# Patient Record
Sex: FEMALE | Race: OTHER | URBAN - METROPOLITAN AREA
[De-identification: names, ages, dates, MRNs, and addresses within clinical notes are randomized per-mention and may not be internally consistent; named-entity substitution may affect disease eponyms.]

---

## 2020-10-15 ENCOUNTER — EMERGENCY (EMERGENCY)
Facility: HOSPITAL | Age: 25
LOS: 1 days | Discharge: HOME | End: 2020-10-15
Attending: EMERGENCY MEDICINE | Admitting: EMERGENCY MEDICINE
Payer: COMMERCIAL

## 2020-10-15 VITALS
DIASTOLIC BLOOD PRESSURE: 80 MMHG | HEART RATE: 97 BPM | WEIGHT: 126.1 LBS | RESPIRATION RATE: 18 BRPM | OXYGEN SATURATION: 96 % | SYSTOLIC BLOOD PRESSURE: 118 MMHG | HEIGHT: 63 IN | TEMPERATURE: 99 F

## 2020-10-15 VITALS
RESPIRATION RATE: 18 BRPM | SYSTOLIC BLOOD PRESSURE: 129 MMHG | DIASTOLIC BLOOD PRESSURE: 73 MMHG | OXYGEN SATURATION: 96 % | HEART RATE: 90 BPM

## 2020-10-15 DIAGNOSIS — N76.89 OTHER SPECIFIED INFLAMMATION OF VAGINA AND VULVA: ICD-10-CM

## 2020-10-15 DIAGNOSIS — N76.4 ABSCESS OF VULVA: ICD-10-CM

## 2020-10-15 LAB
ALBUMIN SERPL ELPH-MCNC: 3.9 G/DL — SIGNIFICANT CHANGE UP (ref 3.3–5)
ALP SERPL-CCNC: 44 U/L — SIGNIFICANT CHANGE UP (ref 40–120)
ALT FLD-CCNC: 6 U/L — LOW (ref 10–45)
ANION GAP SERPL CALC-SCNC: 12 MMOL/L — SIGNIFICANT CHANGE UP (ref 5–17)
AST SERPL-CCNC: 11 U/L — SIGNIFICANT CHANGE UP (ref 10–40)
BASOPHILS # BLD AUTO: 0.02 K/UL — SIGNIFICANT CHANGE UP (ref 0–0.2)
BASOPHILS NFR BLD AUTO: 0.2 % — SIGNIFICANT CHANGE UP (ref 0–2)
BILIRUB SERPL-MCNC: 0.5 MG/DL — SIGNIFICANT CHANGE UP (ref 0.2–1.2)
BUN SERPL-MCNC: 7 MG/DL — SIGNIFICANT CHANGE UP (ref 7–23)
CALCIUM SERPL-MCNC: 9.3 MG/DL — SIGNIFICANT CHANGE UP (ref 8.4–10.5)
CHLORIDE SERPL-SCNC: 105 MMOL/L — SIGNIFICANT CHANGE UP (ref 96–108)
CO2 SERPL-SCNC: 24 MMOL/L — SIGNIFICANT CHANGE UP (ref 22–31)
CREAT SERPL-MCNC: 0.62 MG/DL — SIGNIFICANT CHANGE UP (ref 0.5–1.3)
EOSINOPHIL # BLD AUTO: 0.06 K/UL — SIGNIFICANT CHANGE UP (ref 0–0.5)
EOSINOPHIL NFR BLD AUTO: 0.6 % — SIGNIFICANT CHANGE UP (ref 0–6)
GLUCOSE SERPL-MCNC: 93 MG/DL — SIGNIFICANT CHANGE UP (ref 70–99)
HCT VFR BLD CALC: 37.3 % — SIGNIFICANT CHANGE UP (ref 34.5–45)
HGB BLD-MCNC: 12.2 G/DL — SIGNIFICANT CHANGE UP (ref 11.5–15.5)
IMM GRANULOCYTES NFR BLD AUTO: 0.2 % — SIGNIFICANT CHANGE UP (ref 0–1.5)
LYMPHOCYTES # BLD AUTO: 1.73 K/UL — SIGNIFICANT CHANGE UP (ref 1–3.3)
LYMPHOCYTES # BLD AUTO: 18.4 % — SIGNIFICANT CHANGE UP (ref 13–44)
MCHC RBC-ENTMCNC: 31 PG — SIGNIFICANT CHANGE UP (ref 27–34)
MCHC RBC-ENTMCNC: 32.7 GM/DL — SIGNIFICANT CHANGE UP (ref 32–36)
MCV RBC AUTO: 94.7 FL — SIGNIFICANT CHANGE UP (ref 80–100)
MONOCYTES # BLD AUTO: 0.68 K/UL — SIGNIFICANT CHANGE UP (ref 0–0.9)
MONOCYTES NFR BLD AUTO: 7.2 % — SIGNIFICANT CHANGE UP (ref 2–14)
NEUTROPHILS # BLD AUTO: 6.89 K/UL — SIGNIFICANT CHANGE UP (ref 1.8–7.4)
NEUTROPHILS NFR BLD AUTO: 73.4 % — SIGNIFICANT CHANGE UP (ref 43–77)
NRBC # BLD: 0 /100 WBCS — SIGNIFICANT CHANGE UP (ref 0–0)
PLATELET # BLD AUTO: 258 K/UL — SIGNIFICANT CHANGE UP (ref 150–400)
POTASSIUM SERPL-MCNC: 4.1 MMOL/L — SIGNIFICANT CHANGE UP (ref 3.5–5.3)
POTASSIUM SERPL-SCNC: 4.1 MMOL/L — SIGNIFICANT CHANGE UP (ref 3.5–5.3)
PROT SERPL-MCNC: 7.3 G/DL — SIGNIFICANT CHANGE UP (ref 6–8.3)
RBC # BLD: 3.94 M/UL — SIGNIFICANT CHANGE UP (ref 3.8–5.2)
RBC # FLD: 12.6 % — SIGNIFICANT CHANGE UP (ref 10.3–14.5)
SODIUM SERPL-SCNC: 141 MMOL/L — SIGNIFICANT CHANGE UP (ref 135–145)
WBC # BLD: 9.4 K/UL — SIGNIFICANT CHANGE UP (ref 3.8–10.5)
WBC # FLD AUTO: 9.4 K/UL — SIGNIFICANT CHANGE UP (ref 3.8–10.5)

## 2020-10-15 PROCEDURE — 85025 COMPLETE CBC W/AUTO DIFF WBC: CPT

## 2020-10-15 PROCEDURE — 96374 THER/PROPH/DIAG INJ IV PUSH: CPT | Mod: XU

## 2020-10-15 PROCEDURE — 87070 CULTURE OTHR SPECIMN AEROBIC: CPT

## 2020-10-15 PROCEDURE — 36415 COLL VENOUS BLD VENIPUNCTURE: CPT

## 2020-10-15 PROCEDURE — 99284 EMERGENCY DEPT VISIT MOD MDM: CPT | Mod: 25

## 2020-10-15 PROCEDURE — 87491 CHLMYD TRACH DNA AMP PROBE: CPT

## 2020-10-15 PROCEDURE — 10060 I&D ABSCESS SIMPLE/SINGLE: CPT

## 2020-10-15 PROCEDURE — 96376 TX/PRO/DX INJ SAME DRUG ADON: CPT | Mod: XU

## 2020-10-15 PROCEDURE — 80053 COMPREHEN METABOLIC PANEL: CPT

## 2020-10-15 PROCEDURE — 99284 EMERGENCY DEPT VISIT MOD MDM: CPT

## 2020-10-15 PROCEDURE — 87591 N.GONORRHOEAE DNA AMP PROB: CPT

## 2020-10-15 RX ORDER — HYDROMORPHONE HYDROCHLORIDE 2 MG/ML
0.5 INJECTION INTRAMUSCULAR; INTRAVENOUS; SUBCUTANEOUS ONCE
Refills: 0 | Status: DISCONTINUED | OUTPATIENT
Start: 2020-10-15 | End: 2020-10-15

## 2020-10-15 RX ORDER — OXYCODONE AND ACETAMINOPHEN 5; 325 MG/1; MG/1
1 TABLET ORAL
Qty: 14 | Refills: 0
Start: 2020-10-15 | End: 2020-10-21

## 2020-10-15 RX ORDER — IBUPROFEN 200 MG
1 TABLET ORAL
Qty: 30 | Refills: 0
Start: 2020-10-15 | End: 2020-10-24

## 2020-10-15 RX ADMIN — HYDROMORPHONE HYDROCHLORIDE 0.5 MILLIGRAM(S): 2 INJECTION INTRAMUSCULAR; INTRAVENOUS; SUBCUTANEOUS at 13:33

## 2020-10-15 RX ADMIN — HYDROMORPHONE HYDROCHLORIDE 0.5 MILLIGRAM(S): 2 INJECTION INTRAMUSCULAR; INTRAVENOUS; SUBCUTANEOUS at 11:37

## 2020-10-15 RX ADMIN — HYDROMORPHONE HYDROCHLORIDE 0.5 MILLIGRAM(S): 2 INJECTION INTRAMUSCULAR; INTRAVENOUS; SUBCUTANEOUS at 13:03

## 2020-10-15 NOTE — ED ADULT NURSE NOTE - OBJECTIVE STATEMENT
Patient presents complaining of pain on labia of vagina sent to have bartholian abcess drained. Denies fevers, chills, n/v/d.  Patient states the symptoms began x 3 days ago.  Denies foreign body or ingrown hair.  Denies STD exposure.  Denies pregnancy.

## 2020-10-15 NOTE — ED PROVIDER NOTE - PATIENT PORTAL LINK FT
You can access the FollowMyHealth Patient Portal offered by [facility] by registering at the following website: [email or web address]. By joining [facility]s FollowMyHealth portal, you will also be able to view your health information using other applications (apps) compatible with our system.

## 2020-10-15 NOTE — ED PROVIDER NOTE - NSFOLLOWUPINSTRUCTIONS_ED_ALL_ED_FT
Take all your medications as prescribed and f/u at [facility]  at [address] [place] [address] [place]  phone# [phone] in 1 week            Bartholin's Cyst Abscess     A Bartholin's cyst is a fluid-filled sac that forms on a Bartholin's gland. Bartholin's glands are small glands in the folds of skin around the vaginal opening (labia). These glands produce a fluid to moisten (lubricate) the outside of the vagina during sex.    A cyst that is not large or infected may not cause any problems or require treatment. If the cyst gets infected, it is called a Bartholin's abscess. An abscess may cause symptoms such as pain and swelling and is more likely to require treatment.      What are the causes?    This condition may be caused by a blocked Bartholin's gland. These glands can become blocked due to natural buildup of fluid and oils. Bacteria inside of the cyst can cause infection.    In many cases, the cause is not known.      What increases the risk?  You may be at increased risk of developing a Bartholin's cyst or abscess if:  •You are of childbearing age.      •You have a history of Bartholin's cysts or abscesses.      •You have diabetes.      •You have an STI (sexually transmitted infection).        What are the signs or symptoms?  Symptoms may include:  •A bulge or lump on the labia, near the lower opening of the vagina.      •Discomfort or pain. This may get worse during sex or when walking.      •Redness, swelling, or fluid draining from the area. These may be signs of an abscess.      How severe your symptoms are depends on the size of your cyst and whether it is infected. Infection causes symptoms to get more severe.      How is this diagnosed?  This condition may be diagnosed based on:  •Your symptoms and medical history.      •A physical exam to check for swelling in your vaginal area. You may lie on your back on an exam table and have your feet placed into footrests for the exam.      •Blood tests to check for infections.      •Removal of a fluid sample from the cyst or abscess (biopsy) for testing.      You may work with a health care provider who specializes in women's health (gynecologist) for diagnosis and treatment.      How is this treated?    If your cyst is small, not infected, and not causing symptoms, you may not need any treatment. These cysts often go away on their own, with home care such as hot baths or warm compresses.  If you have a large cyst or an abscess, treatment may include:  •Antibiotic medicine.    •A procedure to drain the fluid inside the cyst or abscess. These procedures involve making an incision in the cyst or abscess so that the fluid drains out, and then one of the following may be done:  •A small, thin tube (catheter) may be placed inside the cyst or abscess so that it does not close and fill up with fluid again (fistulization). The catheter will be removed at a follow-up visit.      •The edges of the incision may be stitched to your skin so that the cyst or abscess stays open (marsupialization). This allows it to continue to drain and not fill up with fluid again.        If you have cysts or abscesses that keep returning (recurring) and have required incision and drainage multiple times, your health care provider may talk with you about surgery to remove the Bartholin's gland.      Follow these instructions at home:    Medicines     •Take over-the-counter and prescription medicines only as told by your health care provider.      •If you were prescribed an antibiotic medicine, take it as told by your health care provider. Do not stop taking the antibiotic even if your condition improves.      Managing pain and swelling     •Try sitz baths to help with pain and swelling. A sitz bath is a warm water bath in which the water only comes up to your hips and should cover your buttocks. You may take sitz baths several times a day.    •Apply heat to the affected area as often as needed. Use the heat source that your health care provider recommends, such as a moist heat pack or a heating pad.   •Place a towel between your skin and the heat source.       •Leave the heat on for 20–30 minutes.       •Remove the heat if your skin turns bright red. This is especially important if you are unable to feel pain, heat, or cold. You may have a greater risk of getting burned.        General instructions     •If your cyst or abscess was drained, follow instructions from your health care provider about how to take care of your wound. Use feminine pads as needed to absorb any drainage.      • Do not push on or squeeze your cyst.      • Do not have sex until the cyst has gone away or your wound from drainage has healed.    •Take these steps to help prevent a Bartholin's cyst from returning, and to prevent other Bartholin's cysts from developing:  •Take a bath or shower once a day. Clean your vaginal area with mild soap and water when you bathe.      •Practice safe sex to prevent STIs. Talk with your health care provider about how to prevent STIs and which forms of birth control (contraception) may be best for you.        •Keep all follow-up visits as told by your health care provider. This is important.        Contact a health care provider if:    •You have a fever.      •You develop redness, swelling, or pain around your cyst.      •You have fluid, blood, pus, or a bad smell coming from your cyst.      •You have a cyst that gets larger or comes back.        Summary    •A Bartholin's cyst is a fluid-filled sac that forms on a Bartholin's gland. These glands are in the folds of skin around the vaginal opening (labia).      •If your cyst is small, not infected, and not causing symptoms, you may not need any treatment. These cysts often go away on their own, with home care such as hot baths or warm compresses.      •If you have a large cyst or an abscess, your health care provider may perform a procedure to drain the fluid.      •If you have cysts or abscesses that keep returning (recurring) and have required incision and drainage multiple times, your health care provider may talk with you about surgery to remove the Bartholin's gland.      This information is not intended to replace advice given to you by your health care provider. Make sure you discuss any questions you have with your health care provider.

## 2020-10-15 NOTE — CONSULT NOTE ADULT - ASSESSMENT
25y G0  with Last Menstrual Period 10/4 presents with right labial abscess, VSS. 25y G0  with Last Menstrual Period 10/4 presents with right labial abscess, VSS, afebrile. Likely bartholin gland abscess.  - abscess incised and drained, word catheter placed, patient instructed to f/u with our resident OBGYN clinic in 3-4 weeks, and to continue the Augmentin she was prescribed at her urgent care clinic.  - abscess culture sent.  - d/w Dr. Cruz

## 2020-10-15 NOTE — CONSULT NOTE ADULT - SUBJECTIVE AND OBJECTIVE BOX
25y G0  with Last Menstrual Period 10/4    presenting with painful right labial swelling since 10/13. She noticed some right labial pruritis starting 10/11, with swelling starting 10/13. She went to an urgent care clinic where they diagnosed it as a right labial abscess, and was started on Augmentin 875mg BID for 10 days, and epsom salt baths. She returned to the urgent care clinic today for follow up, and noted that it did not improve, and was sent here for I&D due to the size of the abscess. She denies fevers but reported chills.    Denies chest pain, palpitations, SOB, n/v.  +flatus, +BM    OB H/x: none    GYN H/x: regular menses, with normal/moderate amount of bleeding for 7 days. Denies abnormal vaginal discharge. Remote hx of ovarian cysts 10+ years ago (resolved with OCP's). Not currently on any contraceptives. No hx of STI's, fibroids, polyps.    MED H/x: denies    SURG H/x: none    Medications: augmentin 875mg BID (day 2 of 10), epsom salt baths    Allergies: nkda       Vital Signs Last 24 Hrs  T(C): 37.1 (15 Oct 2020 11:04), Max: 37.1 (15 Oct 2020 11:04)  T(F): 98.8 (15 Oct 2020 11:04), Max: 98.8 (15 Oct 2020 11:04)  HR: 97 (15 Oct 2020 11:04) (97 - 97)  BP: 118/80 (15 Oct 2020 11:04) (118/80 - 118/80)  RR: 18 (15 Oct 2020 11:04) (18 - 18)  SpO2: 96% (15 Oct 2020 11:04) (96% - 96%)    Physical Exam:  Gen: NAD, comfortable  GI: soft, nontender, nondistended + BS, no rebound no guarding  : right labia majora swelling measuring approximately 3cm by 5cm with erythema and fluctuance, tender to palpation. Right labia minor swelling with erythema and tenderness to palpation.  Ext: no edema, erythema, tenderness     LABS:                        12.2   9.40  )-----------( 258      ( 15 Oct 2020 11:41 )             37.3                 RADIOLOGY & ADDITIONAL STUDIES:     25y G0  with Last Menstrual Period 10/4    presenting with painful right labial swelling since 10/13. She noticed some right labial pruritis starting 10/11, with swelling starting 10/13. She went to an urgent care clinic where they diagnosed it as a right labial abscess, and was started on Augmentin 875mg BID for 10 days, and epsom salt baths. She returned to the urgent care clinic today for follow up, and noted that it did not improve, and was sent here for I&D due to the size of the abscess. She denies fevers but reported chills.    Denies chest pain, palpitations, SOB, n/v.  +flatus, +BM    OB H/x: none    GYN H/x: regular menses, with normal/moderate amount of bleeding for 7 days. Denies abnormal vaginal discharge. Remote hx of ovarian cysts 10+ years ago (resolved with OCP's). Not currently on any contraceptives. No hx of STI's, fibroids, polyps.    MED H/x: denies    SURG H/x: none    Medications: augmentin 875mg BID (day 2 of 10), epsom salt baths    Allergies: nkda       Vital Signs Last 24 Hrs  T(C): 37.1 (15 Oct 2020 11:04), Max: 37.1 (15 Oct 2020 11:04)  T(F): 98.8 (15 Oct 2020 11:04), Max: 98.8 (15 Oct 2020 11:04)  HR: 97 (15 Oct 2020 11:04) (97 - 97)  BP: 118/80 (15 Oct 2020 11:04) (118/80 - 118/80)  RR: 18 (15 Oct 2020 11:04) (18 - 18)  SpO2: 96% (15 Oct 2020 11:04) (96% - 96%)    Physical Exam:  Gen: NAD, comfortable  GI: soft, nontender, nondistended + BS, no rebound no guarding  : right labia majora swelling measuring approximately 3cm by 5cm with erythema and fluctuance, tender to palpation. Right labia minor swelling with erythema and tenderness to palpation.  Ext: no edema, erythema, tenderness     LABS:                        12.2   9.40  )-----------( 258      ( 15 Oct 2020 11:41 )             37.3

## 2020-10-15 NOTE — ED PROVIDER NOTE - OBJECTIVE STATEMENT
24 yo female, generally healthy and w/o any significant PMH, LMP 10/04, in the ER c/o painful swollen labia x 4 days. Pt was  seen at the  2 days ago and started on Augmentin. Symptoms became worse and she went to the UC earlier today again. Pt was referred to ER for I&D to be done by GYN. Pt mentioned some chills yesterday, concerned that her pain is so intense and is not relived by Ibuprofen. Pt denies h/o similar symptoms in the past, denies any viginal discharge, h/o STD's or increased risks for STD's.

## 2020-10-15 NOTE — ED PROVIDER NOTE - ATTENDING CONTRIBUTION TO CARE
24 yo female with right labia majora abscess x 3-4 days. Pt sent to ED from urgent care for I&D by GYN. Pt afebrile, but reports chills at home. She is on PO Augmentin x 2 days. No known h/o similar in the past. Pt AAO, NAD, abd: soft and NT. Gyn saw pt in ED and abscess I&D done. To f/up outpt.

## 2020-10-15 NOTE — ED PROVIDER NOTE - CLINICAL SUMMARY MEDICAL DECISION MAKING FREE TEXT BOX
24 yo female with right labia majora abscess x 3 days. Pt sent to ER for I&D by GYN. Pt afebrile, but reports chills at home. She is on PO Augmentin x 2 days. No known h/o similar in the past. pendng GYN team evaluation and dispo after.

## 2020-10-16 LAB
C TRACH RRNA SPEC QL NAA+PROBE: SIGNIFICANT CHANGE UP
N GONORRHOEA RRNA SPEC QL NAA+PROBE: SIGNIFICANT CHANGE UP
SPECIMEN SOURCE: SIGNIFICANT CHANGE UP

## 2020-10-17 ENCOUNTER — EMERGENCY (EMERGENCY)
Facility: HOSPITAL | Age: 25
LOS: 1 days | Discharge: HOME | End: 2020-10-17
Attending: EMERGENCY MEDICINE | Admitting: EMERGENCY MEDICINE
Payer: COMMERCIAL

## 2020-10-17 VITALS
TEMPERATURE: 99 F | HEIGHT: 63 IN | RESPIRATION RATE: 16 BRPM | DIASTOLIC BLOOD PRESSURE: 77 MMHG | WEIGHT: 126.1 LBS | SYSTOLIC BLOOD PRESSURE: 112 MMHG | HEART RATE: 98 BPM | OXYGEN SATURATION: 99 %

## 2020-10-17 DIAGNOSIS — N75.1 ABSCESS OF BARTHOLIN'S GLAND: ICD-10-CM

## 2020-10-17 DIAGNOSIS — Z48.00 ENCOUNTER FOR CHANGE OR REMOVAL OF NONSURGICAL WOUND DRESSING: ICD-10-CM

## 2020-10-17 DIAGNOSIS — Z48.01 ENCOUNTER FOR CHANGE OR REMOVAL OF SURGICAL WOUND DRESSING: ICD-10-CM

## 2020-10-17 LAB
CULTURE RESULTS: SIGNIFICANT CHANGE UP
SPECIMEN SOURCE: SIGNIFICANT CHANGE UP

## 2020-10-17 PROCEDURE — 99284 EMERGENCY DEPT VISIT MOD MDM: CPT

## 2020-10-17 PROCEDURE — 99284 EMERGENCY DEPT VISIT MOD MDM: CPT | Mod: 25

## 2020-10-17 RX ORDER — OXYCODONE AND ACETAMINOPHEN 5; 325 MG/1; MG/1
1 TABLET ORAL ONCE
Refills: 0 | Status: DISCONTINUED | OUTPATIENT
Start: 2020-10-17 | End: 2020-10-17

## 2020-10-17 RX ORDER — OXYCODONE AND ACETAMINOPHEN 5; 325 MG/1; MG/1
1 TABLET ORAL
Qty: 9 | Refills: 0
Start: 2020-10-17 | End: 2020-10-19

## 2020-10-17 RX ADMIN — OXYCODONE AND ACETAMINOPHEN 1 TABLET(S): 5; 325 TABLET ORAL at 15:53

## 2020-10-17 NOTE — ED PROVIDER NOTE - ATTENDING CONTRIBUTION TO CARE
I discussed the plan of care of the patient directly with the PA and examined the patient while in the Emergency Department. I agree with the HPI and PE as documented by the PA.  Pt was seen 2d ago for bartholins abscess s/p i&d w/ placement of word catheter, who p/w continued pain, swelling, pus drainage. No abd pain, dysuria, vag dc, bleeding. No f/c. + word catheter in place with purulent drainage, with labial swelling. Pt seen by gyn and pe findings improved from prior visit. Pt to continue w/ current care and f/u with her gyn for re-evaluation. Pt given return precautions and is understanding of discharge plan of care. I discussed the plan of care of the patient directly with the PA and examined the patient while in the Emergency Department. I agree with the HPI and PE as documented by the PA.  Pt was seen 2d ago for bartholins abscess s/p i&d w/ placement of word catheter, who p/w continued pain, swelling, pus drainage. No abd pain, dysuria, vag dc, bleeding. No f/c. + word catheter in place with small amt of purulent drainage, + r labial swelling. Pt seen by gyn and pe findings improved from prior visit. Pt to continue w/ current care and f/u with her gyn for re-evaluation. Pt given return precautions and is understanding of discharge plan of care.

## 2020-10-17 NOTE — ED PROVIDER NOTE - OBJECTIVE STATEMENT
The pt is a 26 y/o F, who returns to ED concerned about purulent dc and labial swelling - seen 2 d ago for bartholin abscess - drained by gyn, neil catheter in place. Taking abx as Rx'd. States that swelling is unchanged, pain not as severe, but is very concerned about copious purulent dc. Denies fevers, chills, abd pain, dysuria, hematuria, rash

## 2020-10-17 NOTE — ED PROVIDER NOTE - GENITOURINARY, MLM
+ R labial swelling, + tend to palp, + neil catheter in place and scant purulent dc noted, no rash, no lesions

## 2020-10-17 NOTE — ED ADULT NURSE NOTE - OBJECTIVE STATEMENT
pt is a 26 y/o F arriving to ED for c/c "I feel more drainage from the cyst I had drained". hx Bartholin gland cyst which was drained on Thursday and subsequently started on course of abx. pt states she is concerned for increase in drainage from site. Endorses feeling of "pressure" with urination and x 1 episode of diarrhea yesterday. Denies n/v/d, fever, chills, cough, vaginal bleeding or discharge.

## 2020-10-17 NOTE — ED ADULT NURSE NOTE - CHPI ED NUR SYMPTOMS NEG
no abdominal distension/no blood in stool/no chills/no fever/no hematuria/no nausea/no diarrhea/no dysuria

## 2020-10-17 NOTE — ED PROVIDER NOTE - NSFOLLOWUPINSTRUCTIONS_ED_ALL_ED_FT
continue taking antibiotics, sitz baths, pain medication as needed  Bartholin Cyst  WHAT YOU NEED TO KNOW:  A Bartholin cyst is a lump near the opening to your vagina. You may have pain in this area when you walk or have sex. A Bartholin cyst is caused by blockage of your Bartholin gland. You have a Bartholin gland on each side of your vagina. The glands produce fluid to moisten your vagina. Over time the fluid can build up in the gland and form a cyst. The cyst may become infected. You may be at risk for a Bartholin cyst if you have a sexually transmitted infection. An injury or surgery near your vagina may also increase you risk.   DISCHARGE INSTRUCTIONS:  Contact your gynecologist or healthcare provider if:   •You have a fever.   •Your cyst gets larger or becomes more painful.  •Your cyst returns after treatment.  •Your drain falls out.   •You have pus, redness, or swelling where the cyst was drained.   •You have questions or concerns about your condition or care.  Medicines: You may need any of the following:   •Antibiotics help prevent or treat a bacterial infection.  •NSAIDs, such as ibuprofen, help decrease swelling, pain, and fever. NSAIDs can cause stomach bleeding or kidney problems in certain people. If you take blood thinner medicine, always ask your healthcare provider if NSAIDs are safe for you. Always read the medicine label and follow directions.  •Take your medicine as directed. Contact your healthcare provider if you think your medicine is not helping or if you have side effects. Tell him of her if you are allergic to any medicine. Keep a list of the medicines, vitamins, and herbs you take. Include the amounts, and when and why you take them. Bring the list or the pill bottles to follow-up visits. Carry your medicine list with you in case of an emergency.  Self-care if your cyst was not drained:   •Take a sitz bath 3 to 4 times each day or as directed. A sitz bath may help relieve swelling and pain. It will also help open your Bartholin glands so they drain normally. Place a clean towel on the bottom of your bath tub. Fill your bath tub with warm water up to your hips. You can also buy a sitz bath that fits in your toilet. Sit in the water for 10 minutes.   •Apply a warm compress to your cyst. This may relieve swelling and pain. A warm compress will also help open your Bartholin glands so they drain normally. Wet a washcloth in warm, but not hot, water. Apply the compress for 10 minutes. Repeat 4 times each day.   •Keep the area around your vagina clean. Always wipe front to back. Shower once a day. Gently pat the area dry after a shower.   Self-care after an incision and drainage of your cyst:   •Take a sitz bath in 24 to 48 hours or as directed. You may need to wait to take a sitz bath until after your packing is removed. A sitz bath may help relieve swelling and pain. Take a sitz bath 3 to 4 times each day for 3 days. Place a clean towel on the bottom of your bath tub. Fill your bath tub with warm water up to your hips. You can also buy a sitz bath that fits in your toilet. Sit in the water for 10 minutes.   •Wear a sanitary pad to absorb drainage from your wound. You may have drainage for a few weeks after your cyst is drained.   •Ask your healthcare provider if it is okay to have sex. Sex may cause your drain to fall out. It may also increase your risk for an infection.   •Keep the area around your vagina clean. Always wipe front to back. Shower once a day. Gently pat the area dry after a shower.

## 2020-10-17 NOTE — CONSULT NOTE ADULT - SUBJECTIVE AND OBJECTIVE BOX
26 yo F G0 comes in s/p ED visit on 10/15 where she had a R bartholin gland abscess incised and drained with a word catheter placed.  Pt is continuing her outpatient regimen of Augmentin 875mg BID for 10 days total (start date 10/13), and epsom salt baths. She returned to the ED today because she noticed puss coming from around the word catheter.  Pt was unsure if puss draining is okay.  Pt reports improvement of swelling.  Pt states she still feels pain but it is improved. Pt denies fever, chills.   OB H/x: none, G0    GYN H/x: regular menses, with normal/moderate amount of bleeding for 7 days. Denies abnormal vaginal discharge. Remote hx of ovarian cysts 10+ years ago (resolved with OCP's). Not currently on any contraceptives. No hx of STI's, fibroids, polyps.    MED H/x: denies    SURG H/x: none    Medications: augmentin 875mg BID (day 2 of 10), epsom salt baths    Allergies: NKDA    PHYSICAL EXAM:   Vital Signs Last 24 Hrs  T(C): 37.2 (17 Oct 2020 15:14), Max: 37.2 (17 Oct 2020 15:14)  T(F): 98.9 (17 Oct 2020 15:14), Max: 98.9 (17 Oct 2020 15:14)  HR: 98 (17 Oct 2020 15:14) (98 - 98)  BP: 112/77 (17 Oct 2020 15:14) (112/77 - 112/77)  RR: 16 (17 Oct 2020 15:14) (16 - 16)  SpO2: 99% (17 Oct 2020 15:14) (99% - 99%)    **************************  Constitutional: Alert & Oriented x3, No acute distress  Respiratory: Clear to ausculation bilaterally; no wheezing, rhonchi, or crackles  Cardiovascular: regular rate and rhythm, no murmurs, or gallops  Gastrointestinal: soft, non tender, positive bowel sounds, no rebound or guarding   Pelvic exam: Improved swelling from 10/15, No puss draining at present time, no bleeding.  Word catheter in place.   Extremities: no calf tenderness or swelling    Bartholin Gland Genital Culture Final 10/15: Normal Genital Jocelynn   Urine Chlamydia Amplification Result Not Detected 10/15  Urine GC Amplification Result Not Detected 10/15

## 2020-10-17 NOTE — ED PROVIDER NOTE - PATIENT PORTAL LINK FT
You can access the FollowMyHealth Patient Portal offered by [facility] by registering at the following website: [email or web address]. By joining [facility] FollowMyHealth portal, you will also be able to view your health information using other applications (apps) compatible with our system.

## 2020-10-17 NOTE — CONSULT NOTE ADULT - ASSESSMENT
26 yo G0 comes in s/p ED Visit 10/15 s/p Bartholin gland incised/drained with word catheter placed.  Upon examination pt looks improved, catheter in right location, swelling improved.  Pt to continue antibiotic Augmentin previously prescribed until 10/23.      -Vaginal Examination: R labial swelling but much less than 10/15.  Exponentially better. No pus draining at present time.  Word Catheter seen in correct place.   -Genital Culture 10/15: Normal Jocelynn (Final)   -Pt instructed to continue Augmentin antibiotics until she finishes prescription   -Pt to follow up in the clinic in 3 weeks from now for word catheter removal and to follow up sooner if pt develops fever or any has any systemic symptoms.   -Reassurance given  Dr Driver in agreement with plan.

## 2020-10-17 NOTE — ED PROVIDER NOTE - CLINICAL SUMMARY MEDICAL DECISION MAKING FREE TEXT BOX
pt returns for wound check of bartholin abscess that was drained by gyn 2 d ago, on abx and taking as rx'd - gyn consulted and pt cleared for dc, will rx few percocet for pain, to con't abx, do sitz baths, f/u w/gyn , pt understands and agrees w/plan, strict return precautions given

## 2020-10-17 NOTE — ED ADULT TRIAGE NOTE - CHIEF COMPLAINT QUOTE
"I was here Thursday for a cyst that was drained but more pus is coming out". pt denies fevers and is still taking antibiotics, pt c/o more pain.

## 2023-01-23 NOTE — PROCEDURE NOTE - NSTYPEOFDEBRIDE_SKIN_ALL_CORE
Medication:   Requested Prescriptions     Pending Prescriptions Disp Refills    hydroCHLOROthiazide (MICROZIDE) 12.5 MG capsule [Pharmacy Med Name: HYDROCHLOROTHIAZIDE 12.5MG CAPSULES] 30 capsule 5     Sig: TAKE 1 CAPSULE BY MOUTH DAILY        Last Filled:      Patient Phone Number: [phone] (home)     Last appt: 3/7/2022   Next appt: Visit date not found    Last OARRS: No flowsheet data found.
Non-excisional

## 2024-01-01 NOTE — ED ADULT TRIAGE NOTE - NS ED NURSE BANDS TYPE
Name band; -It may be easier to cut the 's fingernails when the  is sleeping. Use a file until you can see that the skin is no longer attached to the nail.